# Patient Record
Sex: MALE | Race: WHITE | NOT HISPANIC OR LATINO | URBAN - METROPOLITAN AREA
[De-identification: names, ages, dates, MRNs, and addresses within clinical notes are randomized per-mention and may not be internally consistent; named-entity substitution may affect disease eponyms.]

---

## 2023-04-20 ENCOUNTER — APPOINTMENT (OUTPATIENT)
Dept: URBAN - METROPOLITAN AREA CLINIC 195 | Age: 88
Setting detail: DERMATOLOGY
End: 2023-04-21

## 2023-04-20 VITALS
HEIGHT: 71.5 IN | HEART RATE: 88 BPM | WEIGHT: 220 LBS | RESPIRATION RATE: 16 BRPM | DIASTOLIC BLOOD PRESSURE: 102 MMHG | TEMPERATURE: 98.6 F | SYSTOLIC BLOOD PRESSURE: 159 MMHG

## 2023-04-20 VITALS — RESPIRATION RATE: 16 BRPM | HEART RATE: 88 BPM | DIASTOLIC BLOOD PRESSURE: 98 MMHG | SYSTOLIC BLOOD PRESSURE: 156 MMHG

## 2023-04-20 DIAGNOSIS — L57.8 OTHER SKIN CHANGES DUE TO CHRONIC EXPOSURE TO NONIONIZING RADIATION: ICD-10-CM

## 2023-04-20 DIAGNOSIS — Z12.83 ENCOUNTER FOR SCREENING FOR MALIGNANT NEOPLASM OF SKIN: ICD-10-CM

## 2023-04-20 PROBLEM — C44.1122 BASAL CELL CARCINOMA OF SKIN OF RIGHT LOWER EYELID, INCLUDING CANTHUS: Status: ACTIVE | Noted: 2023-04-20

## 2023-04-20 PROCEDURE — 99203 OFFICE O/P NEW LOW 30 MIN: CPT | Mod: 25

## 2023-04-20 PROCEDURE — OTHER MOHS SURGERY: OTHER

## 2023-04-20 PROCEDURE — OTHER ASC CONSULTATION: OTHER

## 2023-04-20 PROCEDURE — OTHER SURGICAL DECISION MAKING: OTHER

## 2023-04-20 PROCEDURE — OTHER MIPS QUALITY: OTHER

## 2023-04-20 PROCEDURE — OTHER COUNSELING: OTHER

## 2023-04-20 PROCEDURE — 17311 MOHS 1 STAGE H/N/HF/G: CPT

## 2023-04-20 NOTE — PROCEDURE: SURGICAL DECISION MAKING
Date Of Surgery - Today Or Tomorrow?: today
Initial Decision For Surgery?: Yes
Complexity (Necessary For Coding; Major - 90 Day Global With Some Exceptions; Minor - 10 Day Global): minor
Discussion: Decision for surgery refers to any surgeries performed today, or discussion of treatment in future.  In general, decision for repair is not made until the final extent of the surgical wound is known.
Risk Assessment Explanation (Does Not Render In The Note): Clinical determination of the probability and/or consequences of an event, such as surgery. Clinical assessment of the level of risk is affected by the nature of the event under consideration for the patient. Modifier 57 is used to indicate an Evaluation and Management (E/M) service resulted in the initial decision to perform surgery either the day before a major surgery (90 day global) or the day of a major surgery.

## 2023-04-20 NOTE — PROCEDURE: MOHS SURGERY
fair balance Bcc Adenoid Histology Text: There were aggregates of basaloid cells demonstrating an adenoid or cribiform pattern.

## 2023-04-20 NOTE — PROCEDURE: MOHS SURGERY
HISTORIAN: mother and father    Sola Butler is a 3 day old male here for weight check.    Parental concerns include: none    HISTORY:  · Day of life: 3 days  · Baby boy, Term, delivered by , Low Transverse [251]  · Feeding: Breast milk ad trish  Current weight:   Weight    10/01/20 1133   Weight: 2.835 kg   ·   · Weight change since birth: -5%       History:   Delivery Information:  This is a full term (39-40 weeks) male infant born at Gestational Age: 38w6d   Time of Birth:    Weight: 6 lb 9.5 oz (2991 g)  Weight Change Since Birth: -5%   Length: 19.5\"    Head Circumference: 34 cm  APGARS: 7/8  Blood type and CESAR: negative  Hearing Screen: Passed  [place] Screen: Pending  CCHD: Passed  Immunizations given in hosptial:   Most Recent Immunizations   Administered Date(s) Administered   • Hep B, adolescent or pediatric 2020         Pregnancy  · Mom's age: This patient's mother is not on file.   · Due date: This patient's mother is not on file.   · Group B Strep Status: This patient's mother is not on file.  · No antibiotics needed.    Prenatal labs:   · This patient's mother is not on file.   · Herpes: This patient's mother is not on file.    Complications of this pregnancy:  · No    Labor  · Delivery Method: , Low Transverse [251]  · Rupture Date:    · Rupture Time:    · Time of Birth:        INTERVAL HISTORY:    Social history/family history reviewed and updated/accepted per Epic.    History reviewed. No pertinent past medical history.    No current outpatient medications on file.     No current facility-administered medications for this visit.        Allergies as of 2020   • (No Known Allergies)       Social History     Tobacco Use   • Smoking status: Never Smoker   • Smokeless tobacco: Never Used       History reviewed. No pertinent family history.    Past Surgical History:   Procedure Laterality Date   • Circumcision, primary         SCREENING/SAFETY:  Child is in a backseat,  rear-facing car seat in the car.    PHYSICAL EXAM:  Pulse 140, temperature 98.1 °F (36.7 °C), temperature source Temporal, resp. rate 40, height 18.75\" (47.6 cm), weight 2.835 kg, head circumference 33.6 cm (13.23\").  GENERAL: The baby is alert, well-nourished and in no distress.    HEENT: The fontanelle is open, soft and flat. There is no significant plagiocephaly. Neck has full range of motion. The baby has a conjugate gaze. Red reflex is normal bilaterally. Conjunctivae are without injection. The ear canals are normal. The tympanic membranes have normal landmarks without erythema. The nares are patent. There is no nasal congestion or discharge. The palate is intact. The oropharynx is clear with no thrush.    NECK: No thyromegaly or masses.    LUNGS: Lungs are clear. There is good air exchange with no increased work of breathing.    CARDIOVASCULAR: The heart is regular rate and rhythm. There is no murmur.  Femoral pulses are normal.    ABDOMEN: The abdomen is soft and nontender, no hepatosplenomegaly or masses.   GENITOURINARY: Beka 1 male and Testes descended bilaterally. : There is no diaper rash.    BACK/EXTREMITIES: Hips are stable with negative Ortolani and Rea maneuvers.  Clavicles are intact without tenderness or deformity. Back is straight and without deformity.  SKIN: No rashes. No birthmarks.    NEUROLOGIC: The baby moves all extremities symmetrically. There is normal tone.  Normal Sergey and grasp reflex.      ASSESSMENT:  Weight check - appropriate weight gain. Feeding well.    PLAN:  Routine anticipatory guidance regarding safety and development. Continue feeding at the current schedule. Doing well on current plan.  Lactation consult placed for aide in latch.      Follow-up:   Return in 2 weeks (on 2020).    Associated diagnoses for this encounter:  1. Health examination for  under 8 days old        No orders of the defined types were placed in this encounter.     Incidental Superficial Basal Cell Carcinoma Histology Text: Incidental superficial BCC was noted at the periphery of the Mohs section and additional stages were performed until clear.

## 2023-04-20 NOTE — HPI: PROCEDURE (MOHS)
Has The Growth Been Previously Biopsied?: has been previously biopsied
Additional History: Patient states his right lower eyelid began bothering him after [place] of 2022.
Body Location Override (Optional): right inferior lid margin